# Patient Record
Sex: MALE | Race: WHITE | NOT HISPANIC OR LATINO | Employment: FULL TIME | URBAN - METROPOLITAN AREA
[De-identification: names, ages, dates, MRNs, and addresses within clinical notes are randomized per-mention and may not be internally consistent; named-entity substitution may affect disease eponyms.]

---

## 2023-05-10 ENCOUNTER — TELEPHONE (OUTPATIENT)
Dept: SLEEP CENTER | Facility: CLINIC | Age: 58
End: 2023-05-10

## 2023-05-10 NOTE — TELEPHONE ENCOUNTER
Called patient to schedule [facility] consult  Left call back message with office number

## 2023-05-20 ENCOUNTER — APPOINTMENT (OUTPATIENT)
Dept: LAB | Facility: MEDICAL CENTER | Age: 58
End: 2023-05-20

## 2023-05-20 DIAGNOSIS — Z01.818 PRE-OPERATIVE EXAMINATION: ICD-10-CM

## 2023-05-20 DIAGNOSIS — G47.33 OBSTRUCTIVE SLEEP APNEA (ADULT) (PEDIATRIC): ICD-10-CM

## 2023-05-20 LAB
ANION GAP SERPL CALCULATED.3IONS-SCNC: 2 MMOL/L (ref 4–13)
BASOPHILS # BLD AUTO: 0.07 THOUSANDS/ÂΜL (ref 0–0.1)
BASOPHILS NFR BLD AUTO: 1 % (ref 0–1)
BUN SERPL-MCNC: 18 MG/DL (ref 5–25)
CALCIUM SERPL-MCNC: 9.4 MG/DL (ref 8.3–10.1)
CHLORIDE SERPL-SCNC: 109 MMOL/L (ref 96–108)
CO2 SERPL-SCNC: 28 MMOL/L (ref 21–32)
CREAT SERPL-MCNC: 0.97 MG/DL (ref 0.6–1.3)
EOSINOPHIL # BLD AUTO: 0.23 THOUSAND/ÂΜL (ref 0–0.61)
EOSINOPHIL NFR BLD AUTO: 3 % (ref 0–6)
ERYTHROCYTE [DISTWIDTH] IN BLOOD BY AUTOMATED COUNT: 12.4 % (ref 11.6–15.1)
GFR SERPL CREATININE-BSD FRML MDRD: 85 ML/MIN/1.73SQ M
GLUCOSE P FAST SERPL-MCNC: 102 MG/DL (ref 65–99)
HCT VFR BLD AUTO: 47.4 % (ref 36.5–49.3)
HGB BLD-MCNC: 15.8 G/DL (ref 12–17)
IMM GRANULOCYTES # BLD AUTO: 0.04 THOUSAND/UL (ref 0–0.2)
IMM GRANULOCYTES NFR BLD AUTO: 1 % (ref 0–2)
LYMPHOCYTES # BLD AUTO: 1.36 THOUSANDS/ÂΜL (ref 0.6–4.47)
LYMPHOCYTES NFR BLD AUTO: 20 % (ref 14–44)
MCH RBC QN AUTO: 30.3 PG (ref 26.8–34.3)
MCHC RBC AUTO-ENTMCNC: 33.3 G/DL (ref 31.4–37.4)
MCV RBC AUTO: 91 FL (ref 82–98)
MONOCYTES # BLD AUTO: 0.74 THOUSAND/ÂΜL (ref 0.17–1.22)
MONOCYTES NFR BLD AUTO: 11 % (ref 4–12)
NEUTROPHILS # BLD AUTO: 4.29 THOUSANDS/ÂΜL (ref 1.85–7.62)
NEUTS SEG NFR BLD AUTO: 64 % (ref 43–75)
NRBC BLD AUTO-RTO: 0 /100 WBCS
PLATELET # BLD AUTO: 197 THOUSANDS/UL (ref 149–390)
PMV BLD AUTO: 12.8 FL (ref 8.9–12.7)
POTASSIUM SERPL-SCNC: 3.3 MMOL/L (ref 3.5–5.3)
RBC # BLD AUTO: 5.22 MILLION/UL (ref 3.88–5.62)
SODIUM SERPL-SCNC: 139 MMOL/L (ref 135–147)
WBC # BLD AUTO: 6.73 THOUSAND/UL (ref 4.31–10.16)

## 2023-05-30 ENCOUNTER — ANESTHESIA EVENT (OUTPATIENT)
Dept: PERIOP | Facility: HOSPITAL | Age: 58
End: 2023-05-30
Payer: COMMERCIAL

## 2023-05-30 RX ORDER — OMEGA-3 FATTY ACIDS/FISH OIL 300-1000MG
CAPSULE ORAL
COMMUNITY

## 2023-05-30 RX ORDER — CETIRIZINE HYDROCHLORIDE 10 MG/1
10 TABLET ORAL DAILY
COMMUNITY

## 2023-05-30 NOTE — PRE-PROCEDURE INSTRUCTIONS
Pre-Surgery Instructions:   Medication Instructions   • aspirin (ECOTRIN LOW STRENGTH) 81 mg EC tablet 5/30/23 per pt last dose 5/29/23    • atenolol (TENORMIN) 50 mg tablet Take day of surgery  • cetirizine (ZyrTEC) 10 mg tablet Take night before surgery   • citalopram (CeleXA) 20 mg tablet Take day of surgery  • hydrochlorothiazide (HYDRODIURIL) 25 mg tablet Hold day of surgery  • Ibuprofen (Advil) 200 MG CAPS Stop taking 3 days prior to surgery  • meloxicam (MOBIC) 15 mg tablet Stop taking 3 days prior to surgery  • metoprolol tartrate (LOPRESSOR) 25 mg tablet Take day of surgery  Medication instructions for day surgery reviewed  Please use only a sip of water to take your instructed medications  Avoid all over the counter vitamins, supplements and NSAIDS for one week prior to surgery per anesthesia guidelines  Tylenol is ok to take as needed  You will receive a call one business day prior to surgery with an arrival time and hospital directions  If your surgery is scheduled on a Monday, the hospital will be calling you on the Friday prior to your surgery  If you have not heard from anyone by 8pm, please call the hospital supervisor through the hospital  at [phone]  Andres Peck [phone])  Do not eat or drink anything after midnight the night before your surgery, including candy, mints, lifesavers, or chewing gum  Do not drink alcohol 24hrs before your surgery  Try not to smoke at least 24hrs before your surgery  Follow the pre surgery showering instructions as listed in the [facility] Surgical Experience Booklet” or otherwise provided by your surgeon's office  Do not shave the surgical area 24 hours before surgery  Do not apply any lotions, creams, including makeup, cologne, deodorant, or perfumes after showering on the day of your surgery  No contact lenses, eye make-up, or artificial eyelashes   Remove nail polish, including gel polish, and any artificial, gel, or acrylic nails if possible  Remove all jewelry including rings and body piercing jewelry  Wear causal clothing that is easy to take on and off  Consider your type of surgery  Keep any valuables, jewelry, piercings at home  Please bring any specially ordered equipment (sling, braces) if indicated  Arrange for a responsible person to drive you to and from the hospital on the day of your surgery  Visitor Guidelines discussed  Call the surgeon's office with any new illnesses, exposures, or additional questions prior to surgery  Please reference your [facility] Surgical Experience Booklet” for additional information to prepare for your upcoming surgery

## 2023-05-31 PROBLEM — I10 HTN (HYPERTENSION): Status: ACTIVE | Noted: 2023-05-31

## 2023-05-31 NOTE — ANESTHESIA PREPROCEDURE EVALUATION
Procedure:  DRUG INDUCED SLEEP ENDOSCOPY (Mouth)    Relevant Problems   ANESTHESIA (within normal limits)      CARDIO   (+) HTN (hypertension)      ENDO (within normal limits)      GI/HEPATIC (within normal limits)      /RENAL (within normal limits)      GYN (within normal limits)      HEMATOLOGY (within normal limits)      MUSCULOSKELETAL (within normal limits)      NEURO/PSYCH (within normal limits)      PULMONARY (within normal limits)             Anesthesia Plan  ASA Score- 2     Anesthesia Type- IV sedation with anesthesia with ASA Monitors  Additional Monitors:   Airway Plan:           Plan Factors-Exercise tolerance (METS): >4 METS  Chart reviewed  EKG reviewed  Existing labs reviewed  Patient summary reviewed  Patient is not a current smoker  Induction- intravenous  Postoperative Plan-     Informed Consent- Anesthetic plan and risks discussed with patient

## 2023-06-01 ENCOUNTER — ANESTHESIA (OUTPATIENT)
Dept: PERIOP | Facility: HOSPITAL | Age: 58
End: 2023-06-01
Payer: COMMERCIAL

## 2023-06-26 ENCOUNTER — HOSPITAL ENCOUNTER (OUTPATIENT)
Facility: HOSPITAL | Age: 58
Setting detail: OUTPATIENT SURGERY
Discharge: HOME | End: 2023-06-26
Attending: OTOLARYNGOLOGY | Admitting: OTOLARYNGOLOGY
Payer: COMMERCIAL

## 2023-06-26 VITALS
RESPIRATION RATE: 18 BRPM | TEMPERATURE: 97.5 F | WEIGHT: 270.28 LBS | BODY MASS INDEX: 32.91 KG/M2 | HEIGHT: 76 IN | HEART RATE: 59 BPM | SYSTOLIC BLOOD PRESSURE: 132 MMHG | DIASTOLIC BLOOD PRESSURE: 74 MMHG | OXYGEN SATURATION: 97 %

## 2023-06-26 LAB
ANION GAP SERPL CALCULATED.3IONS-SCNC: 5 MMOL/L
BUN SERPL-MCNC: 15 MG/DL (ref 5–25)
CALCIUM SERPL-MCNC: 9.2 MG/DL (ref 8.4–10.2)
CHLORIDE SERPL-SCNC: 105 MMOL/L (ref 96–108)
CO2 SERPL-SCNC: 26 MMOL/L (ref 21–32)
CREAT SERPL-MCNC: 0.72 MG/DL (ref 0.6–1.3)
GFR SERPL CREATININE-BSD FRML MDRD: 102 ML/MIN/1.73SQ M
GLUCOSE P FAST SERPL-MCNC: 103 MG/DL (ref 65–99)
GLUCOSE SERPL-MCNC: 103 MG/DL (ref 65–140)
POTASSIUM SERPL-SCNC: 3.5 MMOL/L (ref 3.5–5.3)
SODIUM SERPL-SCNC: 136 MMOL/L (ref 135–147)

## 2023-06-26 PROCEDURE — 42975 DISE EVAL SLP DO BRTH FLX DX: CPT | Performed by: OTOLARYNGOLOGY

## 2023-06-26 PROCEDURE — 80048 BASIC METABOLIC PNL TOTAL CA: CPT | Performed by: ANESTHESIOLOGY

## 2023-06-26 RX ORDER — FENTANYL CITRATE/PF 50 MCG/ML
25 SYRINGE (ML) INJECTION
Status: DISCONTINUED | OUTPATIENT
Start: 2023-06-26 | End: 2023-06-26 | Stop reason: HOSPADM

## 2023-06-26 RX ORDER — PROPOFOL 10 MG/ML
INJECTION, EMULSION INTRAVENOUS AS NEEDED
Status: DISCONTINUED | OUTPATIENT
Start: 2023-06-26 | End: 2023-06-26

## 2023-06-26 RX ORDER — ONDANSETRON 2 MG/ML
4 INJECTION INTRAMUSCULAR; INTRAVENOUS ONCE AS NEEDED
Status: DISCONTINUED | OUTPATIENT
Start: 2023-06-26 | End: 2023-06-26 | Stop reason: HOSPADM

## 2023-06-26 RX ORDER — SODIUM CHLORIDE, SODIUM LACTATE, POTASSIUM CHLORIDE, CALCIUM CHLORIDE 600; 310; 30; 20 MG/100ML; MG/100ML; MG/100ML; MG/100ML
125 INJECTION, SOLUTION INTRAVENOUS CONTINUOUS
Status: DISCONTINUED | OUTPATIENT
Start: 2023-06-26 | End: 2023-06-26 | Stop reason: HOSPADM

## 2023-06-26 RX ORDER — LIDOCAINE HYDROCHLORIDE 20 MG/ML
INJECTION, SOLUTION EPIDURAL; INFILTRATION; INTRACAUDAL; PERINEURAL AS NEEDED
Status: DISCONTINUED | OUTPATIENT
Start: 2023-06-26 | End: 2023-06-26

## 2023-06-26 RX ORDER — SODIUM CHLORIDE 9 MG/ML
125 INJECTION, SOLUTION INTRAVENOUS CONTINUOUS
Status: DISCONTINUED | OUTPATIENT
Start: 2023-06-26 | End: 2023-06-26 | Stop reason: HOSPADM

## 2023-06-26 RX ADMIN — PROPOFOL 30 MG: 10 INJECTION, EMULSION INTRAVENOUS at 12:34

## 2023-06-26 RX ADMIN — LIDOCAINE HYDROCHLORIDE 100 MG: 20 INJECTION, SOLUTION EPIDURAL; INFILTRATION; INTRACAUDAL at 12:33

## 2023-06-26 RX ADMIN — PROPOFOL 50 MG: 10 INJECTION, EMULSION INTRAVENOUS at 12:33

## 2023-06-26 RX ADMIN — SODIUM CHLORIDE 125 ML/HR: 0.9 INJECTION, SOLUTION INTRAVENOUS at 11:45

## 2023-06-26 NOTE — ANESTHESIA POSTPROCEDURE EVALUATION
Post-Op Assessment Note    CV Status:  Stable    Pain management: adequate     Mental Status:  Alert and awake   Hydration Status:  Euvolemic   PONV Controlled:  Controlled   Airway Patency:  Patent      Post Op Vitals Reviewed: Yes      Staff: Anesthesiologist         No notable events documented      /65 (06/26/23 1241)    Temp 97 5 °F (36 4 °C) (06/26/23 1241)    Pulse 60 (06/26/23 1241)   Resp 21 (06/26/23 1241)    SpO2

## 2023-06-26 NOTE — H&P
Surgery Pre-op note/Updated History and Physical    Date of service: 6/26/202312:27 PM      No changes from most recent clinic H&P note  Patient to OR for DISE  Pmh: Reviewed  Pshx: Reviewed  Social history: Reviewed  Medications: Reviewed  ROS: as above      Vitals:    06/26/23 1106   BP: 118/69   Pulse: 60   Resp: 16   Temp: 98 1 °F (36 7 °C)   SpO2: 97%       ENT: Normal  Chest/Heart/Lungs: Breathing, perfused, unremarkable  Abd: Unremarkable  Ext: Unremarkable        The procedure was discussed with the patient, including risks, benefits, and alternatives and all questions were answered  Consent signed and in the chart      Everette Yao MD MPH  Otolaryngology--Head and Neck Surgery  [employer]  6/26/2023 12:27 PM

## 2023-06-26 NOTE — OP NOTE
OPERATIVE REPORT  PATIENT NAME: Jill Nath    :  1965  MRN: [id number]  Pt Location: [facility] OR ROOM 05    SURGERY DATE: 2023    PREOPERATIVE DIAGNOSES: Obstructive sleep apnea with positive pressure   intolerance and persistent sleep apnea after CPAP trial    POSTOPERATIVE DIAGNOSES: Same    PROCEDURES: Drug-induced sleep endoscopy (flexible fiberoptic laryngoscopy   with examination under anesthesia)  ANESTHESIA: IV sedation  ESTIMATED BLOOD LOSS: None  COMPLICATIONS: None  INDICATIONS: Jill Nath is a 61y o  year-old male with a history of symptomatic obstructive sleep apnea, who is intolerant and unable to achieve benefit with positive pressure therapy  He presents today for drug-induced sleep endoscopy to better characterize her locations and pattern of obstruction and to predict appropriate medical and/or surgical options moving forward  FINDINGS: There was no evidence of complete concentric palatal obstruction and they appear to be a candidate anatomically for hypoglossal nerve   stimulation therapy  Body mass index is 32 9 kg/m²  DESCRIPTION OF PROCEDURE: Jill Nath was brought to the operating room and was anesthetized via the standard drug-induced sleep endoscopy protocol  The propofol infusion rate was startedand gradually increased until conditions that mimic sleep were gradually observed  With the patient not responsive to verbal commands, but still with spontaneous respiration, sleep disordered breathing events and associated desaturations were clearly observed    Under these conditions, the flexible endoscope was inserted to examine both sides of the nose as well as the pharynx and larynx  The VOTE score at baseline was V:  partial AP; O:  partial AP; T:  complete AP; E:  complete AP  With simulated jaw advancement and tongue advancement, the hypopharyngeal obstruction and secondarily the palatal collapse also improved       In summary, there was no evidence of complete concentric palatal obstruction and they appear to be a candidate anatomically for hypoglossal nerve stimulation therapy  The patient was then allowed to awaken while monitoring by anesthesia was performed until he was awake and able to maintain his own saturations  The patient was taken to the recovery area in stable condition  All instruments and sponge counts were correct at the end of the procedure  Dwayne Schuster MD, was present for and performed all key elements of the procedure

## 2023-06-26 NOTE — DISCHARGE INSTR - AVS FIRST PAGE
AZAR Haley  Post-Operative Instructions  Office [phone]       Resume home medications  No restrictions  Follow up  as scheduled            How to contact us:    Phone: If you have questions or concerns, please call us at [phone] during business hours (8 am to 5 pm)  On nights and weekends, you may page the ENT surgeon on call  at [facility]   In case of emergency, please call [phone]

## 2023-07-27 ENCOUNTER — OFFICE VISIT (OUTPATIENT)
Dept: SLEEP CENTER | Facility: CLINIC | Age: 58
End: 2023-07-27
Payer: COMMERCIAL

## 2023-07-27 VITALS
WEIGHT: 270.2 LBS | HEIGHT: 76 IN | BODY MASS INDEX: 32.9 KG/M2 | SYSTOLIC BLOOD PRESSURE: 142 MMHG | DIASTOLIC BLOOD PRESSURE: 87 MMHG | HEART RATE: 60 BPM

## 2023-07-27 DIAGNOSIS — G47.33 OSA (OBSTRUCTIVE SLEEP APNEA): Primary | ICD-10-CM

## 2023-07-27 PROCEDURE — 99204 OFFICE O/P NEW MOD 45 MIN: CPT | Performed by: PSYCHIATRY & NEUROLOGY

## 2023-07-27 NOTE — PATIENT INSTRUCTIONS
Lisa number for recall [id number]    As we discussed you are doing great with CPAP, your breathing looks excellent with CPAP. You are doing great with use of your machine. .  On your prior diagnostic sleep study you had 43 times per hour of sleep apnea events. With your machine, you have 1 times per hour of sleep apnea episodes. The goal is to use CPAP all night long, more usage= more health benefit and more symptomatic improvement. If any barriers or problems arise that lead to difficulty using your machine, please let me know either through a [employer] message or alternatively, by calling my office. It is very important to avoid driving while drowsy, this can be very dangerous or even cause serious injury or death. If sleepy, it is not safe to get behind the wheel. If you are driving and feels sleepy, it is very important to pull over right away. Even losing control of the car for a split second can be deadly. If you feel you cannot control when sleepiness occurs and cannot prevented, it is important to not drive at all until this improves. Please let me know if you experience this as it is very important. Nursing Support:  When: Monday through Friday 7A-5PM except holidays  Where: Our direct line is [phone]. If you are having a true emergency please call 911. In the event that the line is busy or it is after hours please leave a voice message and we will return your call. Please speak clearly, leaving your full name, birth date, best number to reach you and the reason for your call. Medication refills: We will need the name of the medication, the dosage, the ordering provider, whether you get a 30 or 90 day refill, and the pharmacy name and address. Medications will be ordered by the provider only. Nurses cannot call in prescriptions. Please allow 7 days for medication refills. Physician requested updates:  If your provider requested that you call with an update after starting medication, please be ready to provide us the medication and dosage, what time you take your medication, the time you attempt to fall asleep, time you fall asleep, when you wake up, and what time you get out of bed. Sleep Study Results: We will contact you with sleep study results and/or next steps after the physician has reviewed your testing.

## 2023-07-27 NOTE — PROGRESS NOTES
Assessment/Plan:  Lubna Azar is a 69-year-old gentleman with PMH of moderate HIRA on CPAP, HTN, depression, and anxiety, who presents today as a new patient with interest in hypoglossal nerve stimulator therapy for his moderate HIRA. He states that he is compliant with CPAP therapy; however, the last data from his machine is from 1/15/2021. Given CPAP compliance and efficacy (residual AHI of 1.3) and borderline BMI he is not a candidate at this time for Inspire. Patient says he has only ever had one CPAP machine. Informed the patient that his CPAP machine was apart of the recent [facility] recall. Ordered the patient a new auto PAP machine (5-15 cm H2O). Encouraged weight loss in conjunction with PCP. Follow-up in 1-3 months after starting his new CPAP machine. We reviewed that overall, inspire is not more efficacious than CPAP so if he has a good result with CPAP, I would not expect any significant improvement. Shaina Creeks is more an alternative treatment option for those who cannot use CPAP successfully. As he feels he cannot sleep without CPAP, there would be some risks in discontinuing this treatment, which I would not recommend. Subjective:   Lubna Azar is a 69-year-old gentleman with PMH of moderate HIRA on CPAP, HTN, depression, and anxiety, who presents today as a new patient with interest in hypoglossal nerve stimulator therapy for his moderate HIRA. He is referred by Dr. Joseph Jerome from otolaryngology. The patient consulted with Dr. Kerry Shelton in April 2023. A home sleep test was completed through his office, that test was a 2 night home sleep test. On the first night of testing the respiratory event index was 33.3, of the 214 respiratory events, there were 71 obstructive apneas, 143 hypopneas, and 0 unclassified apneas. On the second night of testing the respiratory event index was 42.9, of the 314 respiratory events, there was only 1 unclassified apnea.  These events described above were measured with a 3% desaturation criteria, if a 4% desaturation criteria was used, then there was an GISELE of 21.6 and 33.7 over the two nights of testing. He underwent a drug-induced sleep endoscopy in June 2023. The operative note describes that there was no evidence of complete concentric palatal obstruction. The patient subsequently had a telemedicine visit with Dr. Kerry Shelton after that procedure. It was noted his BMI was 32, he stressed to the patient that "we will need to follow weight closely to surgery." Weight loss of ~10 lbs since he began the workup for Inspire. Denies history of weakness, dysphagia, or dysarthria. Denies history of chronic sinusitis or head trauma. On evaluation, patient is sitting in a chair and in no acute distress. Sleep history as below. 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 Started using CPAP in ~2013, which he uses faithfully every night. He says "he cannot sleep without CPAP." CPAP has improved his snoring, witnessed apnea events, and nocturnal awakenings. No significant change in EDS. However, says he is "tired" of using CPAP and would rather have Inspire for convenience. Uses a full face mask and he has had the same machine since he started CPAP. He is unsure who currently manages his CPAP. Infrequent mask leak which can awaken him from sleep. Denies insomnia, pressure intolerance, aerophagia, discomfort, or associated skin issues. 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  Used to awaken ~2-3 times per night prior to CPAP therapy use  Wake Time: 5 AM  Rise Time: 5 AM  Days off schedule: Bed time 10 PM, sleep latency 30 minutes, and wake/rise time 7-8 AM  Naps: denies  Total Sleep Time: ~7-8 hours  Prefers to sleep on back     Sleep medications: denies  Caffeine use: 1 cup of coffee in the AM, prior he drank ~6-7 cups of coffee per day  Alcohol use: infrequent ~1 per month, prior he was drinking a few times a week  Cigarettes: occasional cigar, ~1 per month  Illicit drug use: denies    Sleep Disordered Breathing:  Snoring: denies, yes prior to CPAP  Witnessed apneas: denies, yes prior to CPAP  Shortness of breath or choking/gasping for air: denies, yes prior to CPAP  Morning dry mouth: denies  Morning headaches: denies  Non-refreshing sleep: denies  Nasal congestion: denies  Rhinorrhea: denies  Nocturia: very infrequent    Other sleep related behaviors and symptoms:   Restless leg symptoms: denies  Kicking legs during sleep: denies  Parasomnias: denies  Nightmares: denies  Acid reflux during sleep: denies  Teeth grinding: denies  Sleep paralysis, cataplexy, sleep attacks or hypnagogic or hypnopompic hallucinations: denies  REM Behavioral Sleep Disorder: denies    Daytime Symptoms:   Excessive daytime sleepiness: denies  Driving while drowsy: denies  History of motor vehicle accidents or near misses: denies  Cognitive problems: denies  Mood problems: denies  Problems at work, school or at home: denies    Hershey Sleepiness Scale: 3    Social history updates:  Social History     Tobacco Use   Smoking Status Some Days   • Types: Cigars   Smokeless Tobacco Never   Tobacco Comments    Weekends or so        No cigars this weekend 6/26     Social History     Socioeconomic History   • Marital status: /Civil Union     Spouse name: Not on file   • Number of children: Not on file   • Years of education: Not on file   • Highest education level: Not on file   Occupational History   • Not on file   Tobacco Use   • Smoking status: Some Days     Types: Cigars   • Smokeless tobacco: Never   • Tobacco comments:     Weekends or so          No cigars this weekend 6/26   Substance and Sexual Activity   • Alcohol use: Yes     Comment: "few times per month"   • Drug use: Never   • Sexual activity: Not on file     Comment: defer   Other Topics Concern   • Not on file   Social History Narrative   • Not on file     Social Determinants of Health     Financial Resource Strain: Not on file   Food Insecurity: Not on file   Transportation Needs: Not on file   Physical Activity: Not on file   Stress: Not on file   Social Connections: Not on file   Intimate Partner Violence: Not on file   Housing Stability: Not on file     PhysicalExamination:  Vitals:   /87 (BP Location: Left arm, Patient Position: Sitting, Cuff Size: Adult)   Pulse 60   Ht 6' 4" (1.93 m)   Wt 123 kg (270 lb 3.2 oz)   BMI 32.89 kg/m²     Physical Exam:  General: Sitting in chair, awake alert and oriented to person, place, and time. No acute distress  HEENT: Nares patent, no craniofacial abnormalities. Mucous membranes, moist, no oral lesions, and poor dentition. Mallampati class - III  NECK: Trachea midline, no accessory muscle use, and no stridor   CARDIAC: Regular rate and rhythm  PULM: CTA bilaterally no wheezing, rhonchi or rales. No conversational dyspnea  EXT: Mild pitting LE peripheral edema    NEURO: No focal neurologic deficits, no tongue weakness, and moving all extremities appropriately    Diagnostic Data:  Labs:   I personally reviewed the most recent laboratory data pertinent to today's visit  Admission on 06/26/2023, Discharged on 06/26/2023   Component Date Value   • Sodium 06/26/2023 136    • Potassium 06/26/2023 3.5    • Chloride 06/26/2023 105    • CO2 06/26/2023 26    • ANION GAP 06/26/2023 5    • BUN 06/26/2023 15    • Creatinine 06/26/2023 0.72    • Glucose 06/26/2023 103    • Glucose, Fasting 06/26/2023 103 (H)    • Calcium 06/26/2023 9.2    • eGFR 06/26/2023 102 Appointment on 05/20/2023   Component Date Value   • Sodium 05/20/2023 139    • Potassium 05/20/2023 3.3 (L)    • Chloride 05/20/2023 109 (H)    • CO2 05/20/2023 28    • ANION GAP 05/20/2023 2 (L)    • BUN 05/20/2023 18    • Creatinine 05/20/2023 0.97    • Glucose, Fasting 05/20/2023 102 (H)    • Calcium 05/20/2023 9.4    • eGFR 05/20/2023 85    • WBC 05/20/2023 6.73    • RBC 05/20/2023 5.22    • Hemoglobin 05/20/2023 15.8    • Hematocrit 05/20/2023 47.4    • MCV 05/20/2023 91    • MCH 05/20/2023 30.3    • MCHC 05/20/2023 33.3    • RDW 05/20/2023 12.4    • MPV 05/20/2023 12.8 (H)    • Platelets 72/79/5255 197    • nRBC 05/20/2023 0    • Neutrophils Relative 05/20/2023 64    • Immat GRANS % 05/20/2023 1    • Lymphocytes Relative 05/20/2023 20    • Monocytes Relative 05/20/2023 11    • Eosinophils Relative 05/20/2023 3    • Basophils Relative 05/20/2023 1    • Neutrophils Absolute 05/20/2023 4.29    • Immature Grans Absolute 05/20/2023 0.04    • Lymphocytes Absolute 05/20/2023 1.36    • Monocytes Absolute 05/20/2023 0.74    • Eosinophils Absolute 05/20/2023 0.23    • Basophils Absolute 05/20/2023 0.07        I have personally reviewed pertinent lab results.   Lab Results   Component Value Date    WBC 6.73 05/20/2023    HGB 15.8 05/20/2023    HCT 47.4 05/20/2023    MCV 91 05/20/2023     05/20/2023     Lab Results   Component Value Date    CALCIUM 9.2 06/26/2023    K 3.5 06/26/2023    CO2 26 06/26/2023     06/26/2023    BUN 15 06/26/2023    CREATININE 0.72 06/26/2023     No results found for: "IGE"  No results found for: "ALT", "AST", "GGT", "ALKPHOS", "BILITOT"  No results found for: "IRON", "TIBC", "FERRITIN"  No results found for: "Deanna Meager"  No results found for: "FOLATE"    Sleep studies:  A home sleep test was completed in 2023, that test was a 2 night home sleep test. On the first night of testing the respiratory event index was 33.3, of the 214 respiratory events, there were 71 obstructive apneas, 143 hypopneas, and 0 unclassified apneas. On the second night of testing the respiratory event index was 42.9, of the 314 respiratory events, there was only 1 unclassified apnea. These events described above were measured with a 3% desaturation criteria, if a 4% desaturation criteria was used, then there was an GISELE of 21.6 and 33.7 over the two nights of testing    Compliance Data from 10/18/2020-1/15/2021  Type of CPAP: DreamStation Auto CPAP (5-20 cm H2O)  Mask type:  Full face mask                          Percent usage: 100%, >4 hours                   Average time used: 8 hours, 5 minutes  Auto-CPAP mean pressure: 8.9 cm H2O  Time in large leak: 3.7%  Residual AHI: 1.3                                     Patient was seen in conjunction with   Guerrero Morales DO  [facility] Sleep Medicine Fellow    Data reviewed-notes from otolaryngology, old CPAP data, home sleep test reports

## 2023-08-01 ENCOUNTER — TELEPHONE (OUTPATIENT)
Dept: SLEEP CENTER | Facility: CLINIC | Age: 58
End: 2023-08-01

## 2023-08-04 LAB
DME PARACHUTE DELIVERY DATE REQUESTED: NORMAL
DME PARACHUTE DELIVERY NOTE: NORMAL
DME PARACHUTE ITEM DESCRIPTION: NORMAL
DME PARACHUTE ORDER STATUS: NORMAL
DME PARACHUTE SUPPLIER NAME: NORMAL
DME PARACHUTE SUPPLIER PHONE: NORMAL

## 2023-08-08 ENCOUNTER — TELEPHONE (OUTPATIENT)
Dept: SLEEP CENTER | Facility: CLINIC | Age: 58
End: 2023-08-08

## 2023-08-08 NOTE — TELEPHONE ENCOUNTER
Pt. Spoke with [facility] and wanted his machine shipped to him. He stated he will cancel the flo. With the sleep dept for the set up. I received this message from a rep. At my company and canceled the flo for the pt. Machine was shipped to him.

## 2023-08-10 LAB
DME PARACHUTE DELIVERY DATE ACTUAL: NORMAL
DME PARACHUTE DELIVERY DATE REQUESTED: NORMAL
DME PARACHUTE DELIVERY NOTE: NORMAL
DME PARACHUTE ITEM DESCRIPTION: NORMAL
DME PARACHUTE ORDER STATUS: NORMAL
DME PARACHUTE SUPPLIER NAME: NORMAL
DME PARACHUTE SUPPLIER PHONE: NORMAL

## 2024-01-26 ENCOUNTER — APPOINTMENT (OUTPATIENT)
Dept: LAB | Facility: CLINIC | Age: 59
End: 2024-01-26
Payer: COMMERCIAL

## 2024-01-26 DIAGNOSIS — M25.50 PAIN IN JOINT, MULTIPLE SITES: ICD-10-CM

## 2024-01-26 DIAGNOSIS — I51.9 MYXEDEMA HEART DISEASE: ICD-10-CM

## 2024-01-26 DIAGNOSIS — K21.9 GASTROESOPHAGEAL REFLUX DISEASE WITHOUT ESOPHAGITIS: ICD-10-CM

## 2024-01-26 DIAGNOSIS — I10 ESSENTIAL HYPERTENSION, MALIGNANT: ICD-10-CM

## 2024-01-26 DIAGNOSIS — R53.83 OTHER FATIGUE: ICD-10-CM

## 2024-01-26 DIAGNOSIS — E78.5 HYPERLIPIDEMIA, UNSPECIFIED HYPERLIPIDEMIA TYPE: ICD-10-CM

## 2024-01-26 DIAGNOSIS — D51.9 ANEMIA DUE TO VITAMIN B12 DEFICIENCY, UNSPECIFIED B12 DEFICIENCY TYPE: ICD-10-CM

## 2024-01-26 DIAGNOSIS — M79.10 MYALGIA: ICD-10-CM

## 2024-01-26 DIAGNOSIS — E55.9 AVITAMINOSIS D: ICD-10-CM

## 2024-01-26 DIAGNOSIS — R10.12 ABDOMINAL PAIN, LEFT UPPER QUADRANT: ICD-10-CM

## 2024-01-26 DIAGNOSIS — D51.0 PERNICIOUS ANEMIA: ICD-10-CM

## 2024-01-26 DIAGNOSIS — E13.69 OTHER SPECIFIED DIABETES MELLITUS WITH OTHER SPECIFIED COMPLICATION, UNSPECIFIED WHETHER LONG TERM INSULIN USE (HCC): ICD-10-CM

## 2024-01-26 DIAGNOSIS — D50.9 IRON DEFICIENCY ANEMIA, UNSPECIFIED IRON DEFICIENCY ANEMIA TYPE: ICD-10-CM

## 2024-01-26 DIAGNOSIS — E03.9 MYXEDEMA HEART DISEASE: ICD-10-CM

## 2024-01-26 LAB
25(OH)D3 SERPL-MCNC: 15.8 NG/ML (ref 30–100)
ALBUMIN SERPL BCP-MCNC: 4.3 G/DL (ref 3.5–5)
ALP SERPL-CCNC: 52 U/L (ref 34–104)
ALT SERPL W P-5'-P-CCNC: 17 U/L (ref 7–52)
ANION GAP SERPL CALCULATED.3IONS-SCNC: 8 MMOL/L
AST SERPL W P-5'-P-CCNC: 23 U/L (ref 13–39)
BASOPHILS # BLD AUTO: 0.07 THOUSANDS/ÂΜL (ref 0–0.1)
BASOPHILS NFR BLD AUTO: 1 % (ref 0–1)
BILIRUB SERPL-MCNC: 0.9 MG/DL (ref 0.2–1)
BUN SERPL-MCNC: 21 MG/DL (ref 5–25)
CALCIUM SERPL-MCNC: 9.7 MG/DL (ref 8.4–10.2)
CHLORIDE SERPL-SCNC: 103 MMOL/L (ref 96–108)
CHOLEST SERPL-MCNC: 171 MG/DL
CO2 SERPL-SCNC: 26 MMOL/L (ref 21–32)
CREAT SERPL-MCNC: 0.83 MG/DL (ref 0.6–1.3)
EOSINOPHIL # BLD AUTO: 0.25 THOUSAND/ÂΜL (ref 0–0.61)
EOSINOPHIL NFR BLD AUTO: 3 % (ref 0–6)
ERYTHROCYTE [DISTWIDTH] IN BLOOD BY AUTOMATED COUNT: 12.3 % (ref 11.6–15.1)
ERYTHROCYTE [SEDIMENTATION RATE] IN BLOOD: 16 MM/HOUR (ref 0–19)
EST. AVERAGE GLUCOSE BLD GHB EST-MCNC: 105 MG/DL
FERRITIN SERPL-MCNC: 453 NG/ML (ref 24–336)
FOLATE SERPL-MCNC: 12 NG/ML
GFR SERPL CREATININE-BSD FRML MDRD: 96 ML/MIN/1.73SQ M
GLUCOSE P FAST SERPL-MCNC: 97 MG/DL (ref 65–99)
HBA1C MFR BLD: 5.3 %
HCT VFR BLD AUTO: 47.9 % (ref 36.5–49.3)
HDLC SERPL-MCNC: 41 MG/DL
HGB BLD-MCNC: 16.6 G/DL (ref 12–17)
IMM GRANULOCYTES # BLD AUTO: 0.02 THOUSAND/UL (ref 0–0.2)
IMM GRANULOCYTES NFR BLD AUTO: 0 % (ref 0–2)
IRON SERPL-MCNC: 149 UG/DL (ref 50–212)
LDLC SERPL CALC-MCNC: 115 MG/DL (ref 0–100)
LYMPHOCYTES # BLD AUTO: 1.45 THOUSANDS/ÂΜL (ref 0.6–4.47)
LYMPHOCYTES NFR BLD AUTO: 20 % (ref 14–44)
MCH RBC QN AUTO: 30.4 PG (ref 26.8–34.3)
MCHC RBC AUTO-ENTMCNC: 34.7 G/DL (ref 31.4–37.4)
MCV RBC AUTO: 88 FL (ref 82–98)
MONOCYTES # BLD AUTO: 0.66 THOUSAND/ÂΜL (ref 0.17–1.22)
MONOCYTES NFR BLD AUTO: 9 % (ref 4–12)
NEUTROPHILS # BLD AUTO: 4.97 THOUSANDS/ÂΜL (ref 1.85–7.62)
NEUTS SEG NFR BLD AUTO: 67 % (ref 43–75)
NONHDLC SERPL-MCNC: 130 MG/DL
NRBC BLD AUTO-RTO: 0 /100 WBCS
PLATELET # BLD AUTO: 204 THOUSANDS/UL (ref 149–390)
PMV BLD AUTO: 12 FL (ref 8.9–12.7)
POTASSIUM SERPL-SCNC: 3.3 MMOL/L (ref 3.5–5.3)
PROT SERPL-MCNC: 7.3 G/DL (ref 6.4–8.4)
RBC # BLD AUTO: 5.46 MILLION/UL (ref 3.88–5.62)
SODIUM SERPL-SCNC: 137 MMOL/L (ref 135–147)
T3 SERPL-MCNC: 1.1 NG/ML
T4 SERPL-MCNC: 7.35 UG/DL (ref 6.09–12.23)
TRIGL SERPL-MCNC: 73 MG/DL
TSH SERPL DL<=0.05 MIU/L-ACNC: 1.3 UIU/ML (ref 0.45–4.5)
VIT B12 SERPL-MCNC: 343 PG/ML (ref 180–914)
WBC # BLD AUTO: 7.42 THOUSAND/UL (ref 4.31–10.16)

## 2024-01-26 PROCEDURE — 84436 ASSAY OF TOTAL THYROXINE: CPT

## 2024-01-26 PROCEDURE — 84443 ASSAY THYROID STIM HORMONE: CPT

## 2024-01-26 PROCEDURE — 86618 LYME DISEASE ANTIBODY: CPT

## 2024-01-26 PROCEDURE — 82728 ASSAY OF FERRITIN: CPT

## 2024-01-26 PROCEDURE — 82306 VITAMIN D 25 HYDROXY: CPT

## 2024-01-26 PROCEDURE — 36415 COLL VENOUS BLD VENIPUNCTURE: CPT

## 2024-01-26 PROCEDURE — 82607 VITAMIN B-12: CPT

## 2024-01-26 PROCEDURE — 84480 ASSAY TRIIODOTHYRONINE (T3): CPT

## 2024-01-26 PROCEDURE — 83036 HEMOGLOBIN GLYCOSYLATED A1C: CPT

## 2024-01-26 PROCEDURE — 83540 ASSAY OF IRON: CPT

## 2024-01-26 PROCEDURE — 85652 RBC SED RATE AUTOMATED: CPT

## 2024-01-26 PROCEDURE — 80053 COMPREHEN METABOLIC PANEL: CPT

## 2024-01-26 PROCEDURE — 82746 ASSAY OF FOLIC ACID SERUM: CPT

## 2024-01-26 PROCEDURE — 85025 COMPLETE CBC W/AUTO DIFF WBC: CPT

## 2024-01-26 PROCEDURE — 80061 LIPID PANEL: CPT

## 2024-01-27 LAB — B BURGDOR IGG+IGM SER QL IA: NEGATIVE

## 2024-02-15 ENCOUNTER — OFFICE VISIT (OUTPATIENT)
Dept: URGENT CARE | Facility: CLINIC | Age: 59
End: 2024-02-15
Payer: COMMERCIAL

## 2024-02-15 ENCOUNTER — APPOINTMENT (OUTPATIENT)
Dept: RADIOLOGY | Facility: CLINIC | Age: 59
End: 2024-02-15
Payer: COMMERCIAL

## 2024-02-15 VITALS
OXYGEN SATURATION: 97 % | TEMPERATURE: 98 F | RESPIRATION RATE: 18 BRPM | DIASTOLIC BLOOD PRESSURE: 87 MMHG | HEART RATE: 60 BPM | SYSTOLIC BLOOD PRESSURE: 136 MMHG

## 2024-02-15 DIAGNOSIS — S20.211A RIB CONTUSION, RIGHT, INITIAL ENCOUNTER: Primary | ICD-10-CM

## 2024-02-15 DIAGNOSIS — S20.211A RIB CONTUSION, RIGHT, INITIAL ENCOUNTER: ICD-10-CM

## 2024-02-15 PROCEDURE — 99203 OFFICE O/P NEW LOW 30 MIN: CPT | Performed by: PHYSICIAN ASSISTANT

## 2024-02-15 PROCEDURE — 71101 X-RAY EXAM UNILAT RIBS/CHEST: CPT

## 2024-02-15 NOTE — PATIENT INSTRUCTIONS
How to Use an Incentive Spirometer   WHAT YOU NEED TO KNOW:   An incentive spirometer is a device that measures how deeply you can inhale (breathe in). It helps you take slow, deep breaths to expand and fill your lungs with air. This helps prevent lung problems, such as pneumonia. The incentive spirometer is made up of a breathing tube, an air chamber, and an indicator. The breathing tube is connected to the air chamber and has a mouthpiece at the end. The indicator is found inside the device.   DISCHARGE INSTRUCTIONS:   Reasons to use an incentive spirometer:  An incentive spirometer is most commonly used after surgery. People who are at increased risk of airway or breathing problems may also use one. These include people who smoke or have lung disease. This may also include people who are not active or cannot move well.   How to use an incentive spirometer:  Sit up as straight as possible. Do not bend your head forward or backward. Hold the incentive spirometer in an upright position. Place the target pointer to the level that you need to reach or that your healthcare provider has suggested. Exhale (breathe out) normally and then do the following:  Put the mouthpiece in your mouth and close your lips tightly around it. Do not block the mouthpiece with your tongue.     Inhale slowly and deeply through the mouthpiece to raise the indicator. Try to make the indicator rise up to the level of the goal marker.    When you cannot inhale any longer, remove the mouthpiece and hold your breath for at least 3 seconds.    Exhale normally.    Repeat these steps 10 to 12 times every hour when you are awake, or as often as directed.    Clean the mouthpiece with soap and water after each use. Do not use a disposable mouthpiece for longer than 24 hours.    Keep a log of the highest level you are able to reach each time. This will help healthcare providers see if your lung function improves.       Follow up with your doctor as  directed:  Write down your questions so you remember to ask them during your visits.  Contact your healthcare provider if:   You feel dizzy or lightheaded.    You have a wound that is painful every time you breathe deeply.    You have questions or concerns about how to use your IS.    Return to the emergency department if:   You have chest pain or shortness of breath.    You feel faint.    © Copyright [employer] 2023 Information is for End User's use only and may not be sold, redistributed or otherwise used for commercial purposes.  The above information is an  only. It is not intended as medical advice for individual conditions or treatments. Talk to your doctor, nurse or pharmacist before following any medical regimen to see if it is safe and effective for you.  Rib Contusion   WHAT YOU NEED TO KNOW:   A rib contusion is a bruise on one or more of your ribs.        DISCHARGE INSTRUCTIONS:   Return to the emergency department if:   You have increased chest pain.     You have shortness of breath.     You start to cough up blood.    Your pain does not improve with pain medicine.    Contact your healthcare provider if:   You have a cough.    You have a fever.     You have questions or concerns about your condition or care.    Medicines:  You may need any of the following:  NSAIDs , such as ibuprofen, help decrease swelling, pain, and fever. This medicine is available with or without a doctor's order. NSAIDs can cause stomach bleeding or kidney problems in certain people. If you take blood thinner medicine, always ask if NSAIDs are safe for you. Always read the medicine label and follow directions. Do not give these medicines to children younger than 6 months without direction from a healthcare provider.     Prescription pain medicine  may be given. Ask how to take this medicine safely.    Take your medicine as directed.  Contact your healthcare provider if you think your medicine is not helping or if you  have side effects. Tell your provider if you are allergic to any medicine. Keep a list of the medicines, vitamins, and herbs you take. Include the amounts, and when and why you take them. Bring the list or the pill bottles to follow-up visits. Carry your medicine list with you in case of an emergency.    Deep breathing:   To help prevent pneumonia, take 10 deep breaths every hour, even when you wake up during the night. Brace your ribs with your hands or a pillow while you take deep breaths or cough. This will help decrease your pain.    You may need to use an incentive spirometer to help you take deeper breaths. Put the plastic piece into your mouth and take a very deep breath. Hold your breath as long as you can. Then let out your breath. Do this 10 times in a row every hour while you are awake.    Rest:  Rest your ribs to decrease swelling and allow the injury to heal faster. Avoid activities that may cause more pain or damage to your ribs. As your pain decreases, begin movements slowly.  Ice:  Ice helps decrease swelling and pain. Ice may also help prevent tissue damage. Use an ice pack or put crushed ice in a plastic bag. Cover it with a towel and place it on your bruised area for 15 to 20 minutes every hour as directed.  Follow up with your doctor as directed:  Write down your questions so you remember to ask them during your visits.  © Copyright [employer] 2023 Information is for End User's use only and may not be sold, redistributed or otherwise used for commercial purposes.  The above information is an  only. It is not intended as medical advice for individual conditions or treatments. Talk to your doctor, nurse or pharmacist before following any medical regimen to see if it is safe and effective for you.

## 2024-02-15 NOTE — LETTER
February 15, 2024     Patient: Jessee Somers   YOB: 1965   Date of Visit: 2/15/2024       To Whom it May Concern:    Jessee Somers was seen in my clinic on 2/15/2024. He may return to work on 02/19/2024 .    If you have any questions or concerns, please don't hesitate to call.         Sincerely,          Jabier Edwards PA-C        CC: No Recipients

## 2024-02-15 NOTE — PROGRESS NOTES
[facility]        NAME: Jessee Somers is a 58 y.o. male  : 1965    MRN: [id number]  DATE: February 15, 2024  TIME: 7:48 PM    Assessment and Plan   Rib contusion, right, initial encounter [S20.211A]  1. Rib contusion, right, initial encounter  XR ribs right w pa chest min 3 views            Patient Instructions     Patient Instructions   How to Use an Incentive Spirometer   WHAT YOU NEED TO KNOW:   An incentive spirometer is a device that measures how deeply you can inhale (breathe in). It helps you take slow, deep breaths to expand and fill your lungs with air. This helps prevent lung problems, such as pneumonia. The incentive spirometer is made up of a breathing tube, an air chamber, and an indicator. The breathing tube is connected to the air chamber and has a mouthpiece at the end. The indicator is found inside the device.   DISCHARGE INSTRUCTIONS:   Reasons to use an incentive spirometer:  An incentive spirometer is most commonly used after surgery. People who are at increased risk of airway or breathing problems may also use one. These include people who smoke or have lung disease. This may also include people who are not active or cannot move well.   How to use an incentive spirometer:  Sit up as straight as possible. Do not bend your head forward or backward. Hold the incentive spirometer in an upright position. Place the target pointer to the level that you need to reach or that your healthcare provider has suggested. Exhale (breathe out) normally and then do the following:  Put the mouthpiece in your mouth and close your lips tightly around it. Do not block the mouthpiece with your tongue.     Inhale slowly and deeply through the mouthpiece to raise the indicator. Try to make the indicator rise up to the level of the goal marker.    When you cannot inhale any longer, remove the mouthpiece and hold your breath for at least 3 seconds.    Exhale normally.    Repeat these steps 10 to 12  times every hour when you are awake, or as often as directed.    Clean the mouthpiece with soap and water after each use. Do not use a disposable mouthpiece for longer than 24 hours.    Keep a log of the highest level you are able to reach each time. This will help healthcare providers see if your lung function improves.       Follow up with your doctor as directed:  Write down your questions so you remember to ask them during your visits.  Contact your healthcare provider if:   You feel dizzy or lightheaded.    You have a wound that is painful every time you breathe deeply.    You have questions or concerns about how to use your IS.    Return to the emergency department if:   You have chest pain or shortness of breath.    You feel faint.    © Copyright [employer] 2023 Information is for End User's use only and may not be sold, redistributed or otherwise used for commercial purposes.  The above information is an  only. It is not intended as medical advice for individual conditions or treatments. Talk to your doctor, nurse or pharmacist before following any medical regimen to see if it is safe and effective for you.  Rib Contusion   WHAT YOU NEED TO KNOW:   A rib contusion is a bruise on one or more of your ribs.        DISCHARGE INSTRUCTIONS:   Return to the emergency department if:   You have increased chest pain.     You have shortness of breath.     You start to cough up blood.    Your pain does not improve with pain medicine.    Contact your healthcare provider if:   You have a cough.    You have a fever.     You have questions or concerns about your condition or care.    Medicines:  You may need any of the following:  NSAIDs , such as ibuprofen, help decrease swelling, pain, and fever. This medicine is available with or without a doctor's order. NSAIDs can cause stomach bleeding or kidney problems in certain people. If you take blood thinner medicine, always ask if NSAIDs are safe for you. Always  read the medicine label and follow directions. Do not give these medicines to children younger than 6 months without direction from a healthcare provider.     Prescription pain medicine  may be given. Ask how to take this medicine safely.    Take your medicine as directed.  Contact your healthcare provider if you think your medicine is not helping or if you have side effects. Tell your provider if you are allergic to any medicine. Keep a list of the medicines, vitamins, and herbs you take. Include the amounts, and when and why you take them. Bring the list or the pill bottles to follow-up visits. Carry your medicine list with you in case of an emergency.    Deep breathing:   To help prevent pneumonia, take 10 deep breaths every hour, even when you wake up during the night. Brace your ribs with your hands or a pillow while you take deep breaths or cough. This will help decrease your pain.    You may need to use an incentive spirometer to help you take deeper breaths. Put the plastic piece into your mouth and take a very deep breath. Hold your breath as long as you can. Then let out your breath. Do this 10 times in a row every hour while you are awake.    Rest:  Rest your ribs to decrease swelling and allow the injury to heal faster. Avoid activities that may cause more pain or damage to your ribs. As your pain decreases, begin movements slowly.  Ice:  Ice helps decrease swelling and pain. Ice may also help prevent tissue damage. Use an ice pack or put crushed ice in a plastic bag. Cover it with a towel and place it on your bruised area for 15 to 20 minutes every hour as directed.  Follow up with your doctor as directed:  Write down your questions so you remember to ask them during your visits.  © Copyright [employer] 2023 Information is for End User's use only and may not be sold, redistributed or otherwise used for commercial purposes.  The above information is an  only. It is not intended as medical  advice for individual conditions or treatments. Talk to your doctor, nurse or pharmacist before following any medical regimen to see if it is safe and effective for you.        Follow up with PCP in 3-5 days.  Proceed to  ER if symptoms worsen.    Chief Complaint     Chief Complaint   Patient presents with    Pain     Right lateral rib cage fell exiting tub on 2/11/24 approx 2 inch maribel contusion to area noted denies loc or vomiting post fall         History of Present Illness       Patient presents the clinic for an injury to the right ribs.  He states that he fell 2 days ago while getting out of the tub.  He states that he slipped and fell on the fiberglass side of the tub on his right side.  He states that he has pain when he takes a deep breath or sneezes.  His pain level is approximately 4 out of 10.  He denies associated coughing, shortness of breath, fevers or chills.  He denied associated head injury or neck injury        Review of Systems   Review of Systems   Constitutional:  Negative for chills, fatigue and fever.   Respiratory:  Negative for shortness of breath, wheezing and stridor.         Right-sided rib pain as noted above   Musculoskeletal:  Negative for joint swelling, myalgias, neck pain and neck stiffness.         Current Medications       Current Outpatient Medications:     aspirin (ECOTRIN LOW STRENGTH) 81 mg EC tablet, Take 81 mg by mouth daily, Disp: , Rfl:     atenolol (TENORMIN) 50 mg tablet, Take by mouth, Disp: , Rfl:     citalopram (CeleXA) 20 mg tablet, Take 20 mg by mouth daily, Disp: , Rfl:     hydrochlorothiazide (HYDRODIURIL) 25 mg tablet, Take 25 mg by mouth daily, Disp: , Rfl:     meloxicam (MOBIC) 15 mg tablet, Take 15 mg by mouth daily, Disp: , Rfl:     cetirizine (ZyrTEC) 10 mg tablet, Take 10 mg by mouth daily, Disp: , Rfl:     Ibuprofen (Advil) 200 MG CAPS, Take by mouth, Disp: , Rfl:     metoprolol tartrate (LOPRESSOR) 25 mg tablet, Take 25 mg by mouth daily, Disp: , Rfl:      Current Allergies     Allergies as of 02/15/2024    (No Known Allergies)            The following portions of the patient's history were reviewed and updated as appropriate: allergies, current medications, past family history, past medical history, past social history, past surgical history and problem list.     Past Medical History:   Diagnosis Date    Arthritis     Colon polyp     CPAP (continuous positive airway pressure) dependence     History of COVID-19 2020    per pt no symptoms    [place] (hard of hearing)     slight    Hypertension     PONV (postoperative nausea and vomiting)     Sleep apnea     Wears glasses        Past Surgical History:   Procedure Laterality Date    COLONOSCOPY      HERNIA REPAIR      x5--    LASIK      MOLE REMOVAL      SC DISE DYN EVAL SLEEP DISORDERED BREATHING FLX DX N/A 6/26/2023    Procedure: DRUG INDUCED SLEEP ENDOSCOPY;  Surgeon: Jabier Babcock MD;  Location: [facility] OR;  Service: ENT       History reviewed. No pertinent family history.      Medications have been verified.        Objective   /87   Pulse 60   Temp 98 °F (36.7 °C)   Resp 18   SpO2 97%        Physical Exam     Physical Exam  HENT:      Head: Normocephalic.      Right Ear: Tympanic membrane normal. No hemotympanum. Tympanic membrane is not injected.      Left Ear: Tympanic membrane normal. There is hemotympanum. Tympanic membrane is not injected.   Pulmonary:      Breath sounds: No decreased breath sounds, wheezing, rhonchi or rales.   Chest:          Comments: There is ecchymosis noted on the right lateral ribs with tenderness              I personally interpreted the x-rays and reviewed it with the patient.  There is no acute fracture.  The patient was given a incentive spirometer to help with symptoms.  He will follow-up with his PCP or go to the ER if symptoms worsen

## 2024-11-02 ENCOUNTER — OCCMED (OUTPATIENT)
Dept: URGENT CARE | Facility: CLINIC | Age: 59
End: 2024-11-02
Payer: COMMERCIAL

## 2024-11-02 VITALS
HEART RATE: 67 BPM | BODY MASS INDEX: 34.66 KG/M2 | HEIGHT: 76 IN | SYSTOLIC BLOOD PRESSURE: 124 MMHG | OXYGEN SATURATION: 95 % | DIASTOLIC BLOOD PRESSURE: 88 MMHG | WEIGHT: 284.6 LBS | RESPIRATION RATE: 18 BRPM | TEMPERATURE: 97.6 F

## 2024-11-02 DIAGNOSIS — Z02.1 PHYSICAL EXAM, PRE-EMPLOYMENT: ICD-10-CM

## 2024-11-02 NOTE — PROGRESS NOTES
[facility]        NAME: Jessee Somers is a 58 y.o. male  : 1965    MRN: [id number]  DATE: 2024  TIME: 12:09 PM    Assessment and Plan   Physical exam, pre-employment [Z02.1]  1. 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 He was instructed to have the physical completed as part of his qualifications.     Patient Instructions       Follow up with PCP in 3-5 days.  Proceed to  ER if symptoms worsen.    If tests are performed, our office will contact you with results only if changes need to made to the care plan discussed with you at the visit. You can review your full results on [facility].    Chief Complaint   No chief complaint on file.        History of Present Illness       HPI    Review of Systems   Review of Systems      Current Medications       Current Outpatient Medications:     aspirin (ECOTRIN LOW STRENGTH) 81 mg EC tablet, Take 81 mg by mouth daily, Disp: , Rfl:     atenolol (TENORMIN) 50 mg tablet, Take by mouth, Disp: , Rfl:     cetirizine (ZyrTEC) 10 mg tablet, Take 10 mg by mouth daily, Disp: , Rfl:     citalopram (CeleXA) 20 mg tablet, Take 20 mg by mouth daily, Disp: , Rfl:     hydrochlorothiazide (HYDRODIURIL) 25 mg tablet, Take 25 mg by mouth daily, Disp: , Rfl:     Ibuprofen (Advil) 200 MG CAPS, Take by mouth, Disp: , Rfl:     meloxicam (MOBIC) 15 mg tablet, Take 15 mg by mouth daily, Disp: , Rfl:     metoprolol tartrate (LOPRESSOR) 25 mg tablet, Take 25 mg by mouth daily, Disp: , Rfl:     Current Allergies     Allergies as of 2024    (No Known Allergies)            The following portions of the patient's history were reviewed and updated as appropriate: allergies, current medications, past family history, past medical history, past social history, past surgical history and problem list.     Past Medical  History:   Diagnosis Date    Arthritis     Colon polyp     CPAP (continuous positive airway pressure) dependence     History of COVID-19 2020    per pt no symptoms    [place] (hard of hearing)     slight    Hypertension     PONV (postoperative nausea and vomiting)     Sleep apnea     Wears glasses        Past Surgical History:   Procedure Laterality Date    COLONOSCOPY      HERNIA REPAIR      x5--    LASIK      MOLE REMOVAL      [place] DISE DYN EVAL SLEEP DISORDERED BREATHING FLX DX N/A 6/26/2023    Procedure: DRUG INDUCED SLEEP ENDOSCOPY;  Surgeon: Jabier Babcock MD;  Location: [facility] OR;  Service: ENT       No family history on file.      Medications have been verified.        Objective   There were no vitals taken for this visit.       Physical Exam     Physical Exam

## 2024-11-02 NOTE — PROGRESS NOTES
[facility]        NAME: Jessee Somers is a 58 y.o. male  : 1965    MRN: [id number]  DATE: 2024  TIME: 12:48 PM    Assessment and Plan   No primary diagnosis found.  1. 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 Paperwork requires patient to have PPD administered. Pt was advised he will need a script from his dr for this clinic to administer the PPD. Pt was made aware of the cost and will obtain a script from his PCP on Monday and present to this facility for completion after acquiring.     Patient Instructions     Pt is medically cleared at this time.     Chief Complaint   No chief complaint on file.        History of Present Illness       58-year-old male with past medical history significant for sleep apnea, hypertension, arthritis presents for completion of the physical examination to become a [employer] in his home.  Epic EMR reviewed.        Review of Systems   Review of Systems   HENT:  Positive for hearing loss.         History of hearing loss bilaterally   Eyes:         Glasses   Gastrointestinal:         Abdominal hernias          Current Medications       Current Outpatient Medications:     aspirin (ECOTRIN LOW STRENGTH) 81 mg EC tablet, Take 81 mg by mouth daily, Disp: , Rfl:     atenolol (TENORMIN) 50 mg tablet, Take by mouth, Disp: , Rfl:     cetirizine (ZyrTEC) 10 mg tablet, Take 10 mg by mouth daily, Disp: , Rfl:     citalopram (CeleXA) 20 mg tablet, Take 20 mg by mouth daily, Disp: , Rfl:     hydrochlorothiazide (HYDRODIURIL) 25 mg tablet, Take 25 mg by mouth daily, Disp: , Rfl:     meloxicam (MOBIC) 15 mg tablet, Take 15 mg by mouth daily, Disp: , Rfl:     metoprolol tartrate (LOPRESSOR) 25 mg tablet, Take 25 mg by mouth daily, Disp: , Rfl:     Ibuprofen (Advil) 200 MG CAPS, Take by mouth (Patient not taking: Reported on 2024), Disp: , Rfl:     Current Allergies  "    Allergies as of 11/02/2024    (No Known Allergies)            The following portions of the patient's history were reviewed and updated as appropriate: allergies, current medications, past family history, past medical history, past social history, past surgical history and problem list.     Past Medical History:   Diagnosis Date    Arthritis     Colon polyp     CPAP (continuous positive airway pressure) dependence     History of COVID-19 2020    per pt no symptoms    [place] (hard of hearing)     slight    Hypertension     PONV (postoperative nausea and vomiting)     Sleep apnea     Wears glasses        Past Surgical History:   Procedure Laterality Date    COLONOSCOPY      HERNIA REPAIR      x5--    LASIK      MOLE REMOVAL      [place] DISE DYN EVAL SLEEP DISORDERED BREATHING FLX DX N/A 6/26/2023    Procedure: DRUG INDUCED SLEEP ENDOSCOPY;  Surgeon: Jabier Babcock MD;  Location: [facility];  Service: ENT       History reviewed. No pertinent family history.      Medications have been verified.        Objective   /88 (BP Location: Left arm, Patient Position: Sitting, Cuff Size: Adult)   Pulse 67   Temp 97.6 °F (36.4 °C)   Resp 18   Ht 6' 4\" (1.93 m)   Wt 129 kg (284 lb 9.6 oz)   SpO2 95%   BMI 34.64 kg/m²        Physical Exam     Physical Exam  Vitals and nursing note reviewed. Exam conducted with a chaperone present (spouse).   Constitutional:       Appearance: Normal appearance.   HENT:      Head: Normocephalic and atraumatic.      Right Ear: Tympanic membrane, ear canal and external ear normal. Decreased hearing noted.      Left Ear: Tympanic membrane, ear canal and external ear normal. Decreased hearing noted.      Ears:      Comments: Bilateral chronic hearing loss     Nose:      Right Turbinates: Enlarged.      Left Turbinates: Enlarged.      Right Sinus: No maxillary sinus tenderness or frontal sinus tenderness.      Left Sinus: No maxillary sinus tenderness or frontal sinus tenderness.      " Mouth/Throat:      Lips: Pink. No lesions.      Mouth: Mucous membranes are moist.      Tongue: No lesions. Tongue does not deviate from midline.      Palate: No mass and lesions.      Pharynx: Oropharynx is clear. Uvula midline. Posterior oropharyngeal erythema, uvula swelling and postnasal drip present.      Tonsils: No tonsillar exudate or tonsillar abscesses.   Eyes:      Extraocular Movements: Extraocular movements intact.      Conjunctiva/sclera: Conjunctivae normal.      Pupils: Pupils are equal, round, and reactive to light.   Cardiovascular:      Rate and Rhythm: Normal rate and regular rhythm.      Pulses: Normal pulses.      Heart sounds: Normal heart sounds.   Pulmonary:      Effort: Pulmonary effort is normal.      Breath sounds: Normal breath sounds.   Abdominal:      General: Bowel sounds are normal.      Palpations: Abdomen is soft.      Tenderness: There is no abdominal tenderness. There is no right CVA tenderness, left CVA tenderness, guarding or rebound.      Hernia: A hernia (abdominal wall) is present.   Musculoskeletal:         General: Normal range of motion.      Cervical back: Normal range of motion and neck supple. No rigidity or tenderness.   Lymphadenopathy:      Cervical: No cervical adenopathy.   Skin:     General: Skin is warm and dry.      Capillary Refill: Capillary refill takes less than 2 seconds.   Neurological:      General: No focal deficit present.      Mental Status: He is alert and oriented to person, place, and time.

## 2024-11-04 ENCOUNTER — APPOINTMENT (OUTPATIENT)
Dept: URGENT CARE | Facility: CLINIC | Age: 59
End: 2024-11-04

## 2024-11-04 PROCEDURE — 99213 OFFICE O/P EST LOW 20 MIN: CPT

## 2025-01-30 ENCOUNTER — APPOINTMENT (OUTPATIENT)
Dept: RADIOLOGY | Facility: CLINIC | Age: 60
End: 2025-01-30
Payer: COMMERCIAL

## 2025-01-30 ENCOUNTER — RESULTS FOLLOW-UP (OUTPATIENT)
Dept: URGENT CARE | Facility: CLINIC | Age: 60
End: 2025-01-30

## 2025-01-30 ENCOUNTER — OFFICE VISIT (OUTPATIENT)
Dept: URGENT CARE | Facility: CLINIC | Age: 60
End: 2025-01-30
Payer: COMMERCIAL

## 2025-01-30 VITALS
HEIGHT: 76 IN | SYSTOLIC BLOOD PRESSURE: 122 MMHG | RESPIRATION RATE: 14 BRPM | BODY MASS INDEX: 35.22 KG/M2 | DIASTOLIC BLOOD PRESSURE: 78 MMHG | TEMPERATURE: 96.9 F | HEART RATE: 65 BPM | WEIGHT: 289.2 LBS | OXYGEN SATURATION: 97 %
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
## 2025-01-30 DIAGNOSIS — S89.92XA LEFT KNEE INJURY, INITIAL ENCOUNTER: ICD-10-CM

## 2025-01-30 DIAGNOSIS — S79.911A INJURY OF RIGHT HIP, INITIAL ENCOUNTER: ICD-10-CM

## 2025-01-30 DIAGNOSIS — S70.01XA CONTUSION OF RIGHT HIP, INITIAL ENCOUNTER: ICD-10-CM

## 2025-01-30 DIAGNOSIS — S83.92XA SPRAIN OF LEFT KNEE, UNSPECIFIED LIGAMENT, INITIAL ENCOUNTER: Primary | ICD-10-CM

## 2025-01-30 PROCEDURE — 73502 X-RAY EXAM HIP UNI 2-3 VIEWS: CPT

## 2025-01-30 PROCEDURE — S9083 URGENT CARE CENTER GLOBAL: HCPCS

## 2025-01-30 PROCEDURE — G0382 LEV 3 HOSP TYPE B ED VISIT: HCPCS

## 2025-01-30 PROCEDURE — 73564 X-RAY EXAM KNEE 4 OR MORE: CPT

## 2025-01-30 NOTE — PROGRESS NOTES
[facility]        NAME: Jessee Somers is a 59 y.o. male  : 1965    MRN: [id number]  DATE: 2025  TIME: 9:46 AM    Assessment and Plan   Sprain of left knee, unspecified ligament, initial encounter [S83.92XA]  1. Sprain of left knee, unspecified ligament, initial encounter  XR knee 4+ vw left injury    Ambulatory Referral to Orthopedic Surgery      2. Contusion of right hip, initial encounter  XR hip/pelv 2-3 vws right if performed    Ambulatory Referral to Orthopedic Surgery      3. Fall, initial encounter  XR hip/pelv 2-3 vws right if performed    XR knee 4+ vw left injury        Provider Radiology Interpretation (preliminary)   Final results will be as per official Radiology Report when available:   RIGHT HIP: negative    LEFT KNEE: negative     Will place in DME left hinged knee brace. [facility] paperwork completed and copy provided to lata. Instructed on use, appliation and removal. Instructed to remove every 3 hours and perform gentle ROM to the joint. Follow up with orthopedics in 5-7 days if pain or swelling persist.       Patient Instructions       Follow up with PCP in 3-5 days.  Proceed to  ER if symptoms worsen.    If tests are performed, our office will contact you with results only if changes need to made to the care plan discussed with you at the visit. [facility]    Chief Complaint     Chief Complaint   Patient presents with    Right Hip Injury     Fell last night outside on dirt driveway. Iced/heat, OTC ibuprofen taken @ 0700 this am.   Fell on right hip and twisted left knee.  No bruising noted.   Right hip hurts constantly. Left Knee only w/movement - cracking a lot.    Left Knee Injury         History of Present Illness       59-year-old male patient presents to this clinic with complaint of a fall last night outside on the dirt driveway.  Patient is complaining of right hip and left knee pain.  Patient reports he has used  ice and heat and over-the-counter ibuprofen.  Last dose of ibuprofen 0700 today.  Patient reports he fell onto the right hip and twisted the left knee.      Review of Systems   Review of Systems   Musculoskeletal:  Positive for arthralgias and gait problem.         Current Medications       Current Outpatient Medications:     atenolol (TENORMIN) 50 mg tablet, Take by mouth, Disp: , Rfl:     cetirizine (ZyrTEC) 10 mg tablet, Take 10 mg by mouth daily, Disp: , Rfl:     citalopram (CeleXA) 20 mg tablet, Take 20 mg by mouth daily, Disp: , Rfl:     hydrochlorothiazide (HYDRODIURIL) 25 mg tablet, Take 25 mg by mouth daily, Disp: , Rfl:     meloxicam (MOBIC) 15 mg tablet, Take 15 mg by mouth daily, Disp: , Rfl:     aspirin (ECOTRIN LOW STRENGTH) 81 mg EC tablet, Take 81 mg by mouth daily (Patient not taking: Reported on 1/30/2025), Disp: , Rfl:     Ibuprofen (Advil) 200 MG CAPS, Take by mouth (Patient not taking: Reported on 1/30/2025), Disp: , Rfl:     metoprolol tartrate (LOPRESSOR) 25 mg tablet, Take 25 mg by mouth daily (Patient not taking: Reported on 1/30/2025), Disp: , Rfl:     Current Allergies     Allergies as of 01/30/2025    (No Known Allergies)            The following portions of the patient's history were reviewed and updated as appropriate: allergies, current medications, past family history, past medical history, past social history, past surgical history and problem list.     Past Medical History:   Diagnosis Date    Arthritis     Colon polyp     CPAP (continuous positive airway pressure) dependence     History of COVID-19 2020    per pt no symptoms    [place] (hard of hearing)     slight    Hypertension     PONV (postoperative nausea and vomiting)     Sleep apnea     Wears glasses        Past Surgical History:   Procedure Laterality Date    COLONOSCOPY      HERNIA REPAIR      x5--    LASIK      MOLE REMOVAL      NE DISE DYN EVAL SLEEP DISORDERED BREATHING FLX DX N/A 6/26/2023    Procedure: DRUG INDUCED SLEEP  "ENDOSCOPY;  Surgeon: Jabier Babcock MD;  Location: [facility] OR;  Service: ENT       Family History   Problem Relation Age of Onset    Heart disease Mother     No Known Problems Father          Medications have been verified.        Objective   /78   Pulse 65   Temp (!) 96.9 °F (36.1 °C)   Resp 14   Ht 6' 4\" (1.93 m)   Wt 131 kg (289 lb 3.2 oz)   SpO2 97%   BMI 35.20 kg/m²        Physical Exam     Physical Exam  Vitals and nursing note reviewed.   Constitutional:       Appearance: Normal appearance. He is obese.   HENT:      Head: Normocephalic and atraumatic.   Cardiovascular:      Rate and Rhythm: Normal rate and regular rhythm.      Pulses: Normal pulses.      Heart sounds: Normal heart sounds.   Pulmonary:      Effort: Pulmonary effort is normal.      Breath sounds: Normal breath sounds.   Musculoskeletal:         General: Tenderness present. No swelling or deformity.      Right hip: Tenderness present. No deformity, lacerations, bony tenderness or crepitus. Normal range of motion. Normal strength.      Left hip: Normal.        Legs:       Comments: Right hip tenderness posterior to the area of the greater trochanter (muscle and tissue tenderness); no discoloration or deformity; no swelling; increased pain with weight bearing; sensation and distal pulses intact  LEFT KNEE: tender to palpation in all aspects; no appreciable swelling or deformity; no discoloration; ROM slightly limited due to pain (mainly internal and external rotation aggravate the pain)   Skin:     General: Skin is warm and dry.      Capillary Refill: Capillary refill takes less than 2 seconds.   Neurological:      Mental Status: He is alert.                   "

## 2025-01-30 NOTE — PATIENT INSTRUCTIONS
Your xray report will have a final reading by a radiologist in the next 24 hours. If there is anything abnormal, the staff will be in contact with you regarding an updated plan of care.    You may take over the counter Tylenol (Acetaminophen) and/or Motrin (Ibuprofen) as needed, as directed on packaging.     Rest and elevate the affected extremity.    Use the brace/splint/wrap as ordered and directed.     Ice the affected area 4-6 times per day for a period of 15-20 minutes each time. DO NOT place an ice pack against your bare skin.    After the first 48-72 hours you may alternate heat and ice or use what is most comfortable for you.  You may use topical pain agent(s) of choice and according to package directions (lidocaine patches, biofreeze, salonpas, icy hot, etc.). PLEASE BE SURE TO REMOVE THE REMNANTS OF THESE ITEMS PRIOR TO USING A HEATING PAD TO THE AREA AS YOU CAN SUSTAIN A BURN TO YOUR SKIN!!    Follow up with orthopedics in 5-7 days if your pain is not improving

## 2025-05-10 ENCOUNTER — APPOINTMENT (OUTPATIENT)
Dept: LAB | Facility: CLINIC | Age: 60
End: 2025-05-10
Attending: INTERNAL MEDICINE
Payer: COMMERCIAL

## 2025-05-10 DIAGNOSIS — M79.10 MYALGIA: ICD-10-CM

## 2025-05-10 DIAGNOSIS — E78.5 HYPERLIPIDEMIA, UNSPECIFIED HYPERLIPIDEMIA TYPE: ICD-10-CM

## 2025-05-10 DIAGNOSIS — M19.90 OSTEOARTHRITIS, UNSPECIFIED OSTEOARTHRITIS TYPE, UNSPECIFIED SITE: ICD-10-CM

## 2025-05-10 DIAGNOSIS — D50.9 IRON DEFICIENCY ANEMIA, UNSPECIFIED IRON DEFICIENCY ANEMIA TYPE: ICD-10-CM

## 2025-05-10 DIAGNOSIS — E13.69 OTHER SPECIFIED DIABETES MELLITUS WITH OTHER SPECIFIED COMPLICATION, UNSPECIFIED WHETHER LONG TERM INSULIN USE (HCC): ICD-10-CM

## 2025-05-10 DIAGNOSIS — K21.9 GASTROESOPHAGEAL REFLUX DISEASE WITHOUT ESOPHAGITIS: ICD-10-CM

## 2025-05-10 DIAGNOSIS — R10.9 ABDOMINAL PAIN, UNSPECIFIED ABDOMINAL LOCATION: ICD-10-CM

## 2025-05-10 DIAGNOSIS — R53.83 OTHER FATIGUE: ICD-10-CM

## 2025-05-10 DIAGNOSIS — D51.0 PERNICIOUS ANEMIA: ICD-10-CM

## 2025-05-10 DIAGNOSIS — N42.9 DISEASE OF PROSTATE: ICD-10-CM

## 2025-05-10 DIAGNOSIS — D51.9 ANEMIA DUE TO VITAMIN B12 DEFICIENCY, UNSPECIFIED B12 DEFICIENCY TYPE: ICD-10-CM

## 2025-05-10 DIAGNOSIS — E55.9 VITAMIN D DEFICIENCY: ICD-10-CM

## 2025-05-10 LAB
25(OH)D3 SERPL-MCNC: 19.8 NG/ML (ref 30–100)
ALBUMIN SERPL BCG-MCNC: 4.2 G/DL (ref 3.5–5)
ALP SERPL-CCNC: 55 U/L (ref 34–104)
ALT SERPL W P-5'-P-CCNC: 19 U/L (ref 7–52)
ANION GAP SERPL CALCULATED.3IONS-SCNC: 9 MMOL/L (ref 4–13)
AST SERPL W P-5'-P-CCNC: 22 U/L (ref 13–39)
BASOPHILS # BLD AUTO: 0.08 THOUSANDS/ÂΜL (ref 0–0.1)
BASOPHILS NFR BLD AUTO: 1 % (ref 0–1)
BILIRUB SERPL-MCNC: 1.17 MG/DL (ref 0.2–1)
BUN SERPL-MCNC: 25 MG/DL (ref 5–25)
CALCIUM SERPL-MCNC: 9.5 MG/DL (ref 8.4–10.2)
CHLORIDE SERPL-SCNC: 102 MMOL/L (ref 96–108)
CHOLEST SERPL-MCNC: 152 MG/DL (ref ?–200)
CO2 SERPL-SCNC: 29 MMOL/L (ref 21–32)
CREAT SERPL-MCNC: 0.81 MG/DL (ref 0.6–1.3)
EOSINOPHIL # BLD AUTO: 0.2 THOUSAND/ÂΜL (ref 0–0.61)
EOSINOPHIL NFR BLD AUTO: 3 % (ref 0–6)
ERYTHROCYTE [DISTWIDTH] IN BLOOD BY AUTOMATED COUNT: 12.4 % (ref 11.6–15.1)
FOLATE SERPL-MCNC: 18.9 NG/ML
GFR SERPL CREATININE-BSD FRML MDRD: 96 ML/MIN/1.73SQ M
GLUCOSE P FAST SERPL-MCNC: 98 MG/DL (ref 65–99)
HCT VFR BLD AUTO: 48.4 % (ref 36.5–49.3)
HDLC SERPL-MCNC: 37 MG/DL
HGB BLD-MCNC: 16.1 G/DL (ref 12–17)
IMM GRANULOCYTES # BLD AUTO: 0.03 THOUSAND/UL (ref 0–0.2)
IMM GRANULOCYTES NFR BLD AUTO: 1 % (ref 0–2)
IRON SERPL-MCNC: 199 UG/DL (ref 50–212)
LDLC SERPL CALC-MCNC: 100 MG/DL (ref 0–100)
LYMPHOCYTES # BLD AUTO: 1.3 THOUSANDS/ÂΜL (ref 0.6–4.47)
LYMPHOCYTES NFR BLD AUTO: 21 % (ref 14–44)
MCH RBC QN AUTO: 31.1 PG (ref 26.8–34.3)
MCHC RBC AUTO-ENTMCNC: 33.3 G/DL (ref 31.4–37.4)
MCV RBC AUTO: 93 FL (ref 82–98)
MONOCYTES # BLD AUTO: 0.71 THOUSAND/ÂΜL (ref 0.17–1.22)
MONOCYTES NFR BLD AUTO: 11 % (ref 4–12)
NEUTROPHILS # BLD AUTO: 3.99 THOUSANDS/ÂΜL (ref 1.85–7.62)
NEUTS SEG NFR BLD AUTO: 63 % (ref 43–75)
NONHDLC SERPL-MCNC: 115 MG/DL
NRBC BLD AUTO-RTO: 0 /100 WBCS
PLATELET # BLD AUTO: 193 THOUSANDS/UL (ref 149–390)
PMV BLD AUTO: 12.1 FL (ref 8.9–12.7)
POTASSIUM SERPL-SCNC: 3.8 MMOL/L (ref 3.5–5.3)
PROT SERPL-MCNC: 7.1 G/DL (ref 6.4–8.4)
PSA SERPL-MCNC: 1.95 NG/ML (ref 0–4)
RBC # BLD AUTO: 5.18 MILLION/UL (ref 3.88–5.62)
SODIUM SERPL-SCNC: 140 MMOL/L (ref 135–147)
T3 SERPL-MCNC: 1.2 NG/ML (ref 0.9–1.8)
T4 SERPL-MCNC: 7.25 UG/DL (ref 6.09–12.23)
TRIGL SERPL-MCNC: 73 MG/DL (ref ?–150)
TSH SERPL DL<=0.05 MIU/L-ACNC: 0.83 UIU/ML (ref 0.45–4.5)
VIT B12 SERPL-MCNC: 374 PG/ML (ref 180–914)
WBC # BLD AUTO: 6.31 THOUSAND/UL (ref 4.31–10.16)

## 2025-05-10 PROCEDURE — 84480 ASSAY TRIIODOTHYRONINE (T3): CPT

## 2025-05-10 PROCEDURE — 84153 ASSAY OF PSA TOTAL: CPT

## 2025-05-10 PROCEDURE — 86618 LYME DISEASE ANTIBODY: CPT

## 2025-05-10 PROCEDURE — 83540 ASSAY OF IRON: CPT

## 2025-05-10 PROCEDURE — 82306 VITAMIN D 25 HYDROXY: CPT

## 2025-05-10 PROCEDURE — 84436 ASSAY OF TOTAL THYROXINE: CPT

## 2025-05-10 PROCEDURE — 80053 COMPREHEN METABOLIC PANEL: CPT

## 2025-05-10 PROCEDURE — 36415 COLL VENOUS BLD VENIPUNCTURE: CPT

## 2025-05-10 PROCEDURE — 85025 COMPLETE CBC W/AUTO DIFF WBC: CPT

## 2025-05-10 PROCEDURE — 84443 ASSAY THYROID STIM HORMONE: CPT

## 2025-05-10 PROCEDURE — 82607 VITAMIN B-12: CPT

## 2025-05-10 PROCEDURE — 82746 ASSAY OF FOLIC ACID SERUM: CPT

## 2025-05-10 PROCEDURE — 80061 LIPID PANEL: CPT

## 2025-05-11 LAB — B BURGDOR IGG+IGM SER QL IA: NEGATIVE

## (undated) DEVICE — ASCOPE 4 RHINOLARYNGO SLIM: Brand: ASCOPE™ 4 RHINOLARYNGO SLIM